# Patient Record
Sex: MALE | Race: WHITE
[De-identification: names, ages, dates, MRNs, and addresses within clinical notes are randomized per-mention and may not be internally consistent; named-entity substitution may affect disease eponyms.]

---
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
## 2021-11-19 ENCOUNTER — HOSPITAL ENCOUNTER (EMERGENCY)
Dept: HOSPITAL 47 - EC | Age: 21
Discharge: HOME | End: 2021-11-19
Payer: COMMERCIAL

## 2021-11-19 VITALS — TEMPERATURE: 98.2 F

## 2021-11-19 VITALS — HEART RATE: 67 BPM | DIASTOLIC BLOOD PRESSURE: 66 MMHG | RESPIRATION RATE: 16 BRPM | SYSTOLIC BLOOD PRESSURE: 120 MMHG

## 2021-11-19 DIAGNOSIS — F17.200: ICD-10-CM

## 2021-11-19 DIAGNOSIS — R40.4: Primary | ICD-10-CM

## 2021-11-19 DIAGNOSIS — J45.909: ICD-10-CM

## 2021-11-19 DIAGNOSIS — R40.0: ICD-10-CM

## 2021-11-19 LAB
ALBUMIN SERPL-MCNC: 4.3 G/DL (ref 3.5–5)
ALP SERPL-CCNC: 50 U/L (ref 38–126)
ALT SERPL-CCNC: 18 U/L (ref 4–49)
ANION GAP SERPL CALC-SCNC: 8 MMOL/L
AST SERPL-CCNC: 25 U/L (ref 17–59)
BASOPHILS # BLD AUTO: 0 K/UL (ref 0–0.2)
BASOPHILS NFR BLD AUTO: 0 %
BUN SERPL-SCNC: 12 MG/DL (ref 9–20)
CALCIUM SPEC-MCNC: 9.7 MG/DL (ref 8.4–10.2)
CHLORIDE SERPL-SCNC: 105 MMOL/L (ref 98–107)
CO2 SERPL-SCNC: 26 MMOL/L (ref 22–30)
EOSINOPHIL # BLD AUTO: 0 K/UL (ref 0–0.7)
EOSINOPHIL NFR BLD AUTO: 1 %
ERYTHROCYTE [DISTWIDTH] IN BLOOD BY AUTOMATED COUNT: 5.32 M/UL (ref 4.3–5.9)
ERYTHROCYTE [DISTWIDTH] IN BLOOD: 12.2 % (ref 11.5–15.5)
GLUCOSE SERPL-MCNC: 98 MG/DL (ref 74–99)
HCT VFR BLD AUTO: 47.5 % (ref 39–53)
HGB BLD-MCNC: 16.4 GM/DL (ref 13–17.5)
LYMPHOCYTES # SPEC AUTO: 1.5 K/UL (ref 1–4.8)
LYMPHOCYTES NFR SPEC AUTO: 23 %
MAGNESIUM SPEC-SCNC: 1.9 MG/DL (ref 1.6–2.3)
MCH RBC QN AUTO: 30.9 PG (ref 25–35)
MCHC RBC AUTO-ENTMCNC: 34.5 G/DL (ref 31–37)
MCV RBC AUTO: 89.4 FL (ref 80–100)
MONOCYTES # BLD AUTO: 0.2 K/UL (ref 0–1)
MONOCYTES NFR BLD AUTO: 4 %
NEUTROPHILS # BLD AUTO: 4.7 K/UL (ref 1.3–7.7)
NEUTROPHILS NFR BLD AUTO: 72 %
PLATELET # BLD AUTO: 216 K/UL (ref 150–450)
POTASSIUM SERPL-SCNC: 4.1 MMOL/L (ref 3.5–5.1)
PROT SERPL-MCNC: 7.2 G/DL (ref 6.3–8.2)
SODIUM SERPL-SCNC: 139 MMOL/L (ref 137–145)
WBC # BLD AUTO: 6.5 K/UL (ref 4–11)

## 2021-11-19 PROCEDURE — 99285 EMERGENCY DEPT VISIT HI MDM: CPT

## 2021-11-19 PROCEDURE — 85025 COMPLETE CBC W/AUTO DIFF WBC: CPT

## 2021-11-19 PROCEDURE — 96360 HYDRATION IV INFUSION INIT: CPT

## 2021-11-19 PROCEDURE — 83735 ASSAY OF MAGNESIUM: CPT

## 2021-11-19 PROCEDURE — 93005 ELECTROCARDIOGRAM TRACING: CPT

## 2021-11-19 PROCEDURE — 36415 COLL VENOUS BLD VENIPUNCTURE: CPT

## 2021-11-19 PROCEDURE — 80053 COMPREHEN METABOLIC PANEL: CPT

## 2021-11-19 PROCEDURE — 70450 CT HEAD/BRAIN W/O DYE: CPT

## 2021-11-19 NOTE — CT
EXAMINATION TYPE: CT brain wo con

 

DATE OF EXAM: 11/19/2021

 

COMPARISON: None.

 

HISTORY: Seizure 2 days ago fatigue since.

 

CT DLP: 1192.4 mGycm.  Automated Exposure Control for Dose Reduction was Utilized.

 

 

TECHNIQUE: CT scan of the head is performed without contrast.

 

FINDINGS:   There is no acute intracranial hemorrhage, mass effect, or midline shift identified.  The
 ventricles and sulci are within normal limits in size. Gray-white matter differentiation is maintain
ed. The globes are intact and the visualized sinuses are clear. The calvarium is intact.

 

IMPRESSION: Unremarkable study.

## 2021-11-19 NOTE — ED
General Adult HPI





- General


Chief complaint: Recheck/Abnormal Lab/Rx


Stated complaint: not feeling right, seizure wednesday


Time Seen by Provider: 11/19/21 13:23


Source: patient, RN notes reviewed


Mode of arrival: ambulatory


Limitations: no limitations





- History of Present Illness


Initial comments: 





Patient is a pleasant 21-year-old male presenting to the emergency department 

not feeling well.  Patient did have an episode 36 hours ago while eating dinner 

where he became unresponsive.  This episode lasted around a minute.  Patient did

have some abnormal movements during this.  It is questionable whether or not 

this may have been seizure activity.  Patient was slightly confused afterwards 

and has been drowsy since that time.  Patient was especially drowsy that night. 

Patient has had some light sensitivity.  No significant headaches or weakness.  

Patient does have a history of passing out once previously however no history of

seizure.





- Related Data


                                Home Medications











 Medication  Instructions  Recorded  Confirmed


 


LORazepam [Ativan] 1 mg PO TID PRN 11/19/21 11/19/21


 


Mirtazapine [Remeron] 30 mg PO HS 11/19/21 11/19/21











                                    Allergies











Allergy/AdvReac Type Severity Reaction Status Date / Time


 


No Known Allergies Allergy   Verified 11/19/21 13:47














Review of Systems


ROS Statement: 


Those systems with pertinent positive or pertinent negative responses have been 

documented in the HPI.





ROS Other: All systems not noted in ROS Statement are negative.


Constitutional: Denies: fever


Eyes: Denies: eye pain


ENT: Denies: ear pain


Respiratory: Denies: cough


Cardiovascular: Denies: chest pain


Endocrine: Reports: fatigue


Gastrointestinal: Reports: nausea.  Denies: abdominal pain, vomiting


Musculoskeletal: Denies: back pain


Skin: Denies: rash


Neurological: Denies: weakness, confusion





Past Medical History


Past Medical History: Asthma


History of Any Multi-Drug Resistant Organisms: None Reported


Past Surgical History: No Surgical Hx Reported


Past Psychological History: Anxiety, Depression


Smoking Status: Current every day smoker


Past Alcohol Use History: None Reported, Rare


Past Drug Use History: Marijuana





General Exam


Limitations: no limitations


General appearance: alert, in no apparent distress


Head exam: Present: normocephalic


Eye exam: Present: normal appearance, PERRL, EOMI.  Absent: nystagmus


ENT exam: Present: normal oropharynx


Neck exam: Present: normal inspection


Respiratory exam: Present: normal lung sounds bilaterally


Cardiovascular Exam: Present: regular rate, normal rhythm


GI/Abdominal exam: Present: soft.  Absent: tenderness


Extremities exam: Present: normal inspection


Neurological exam: Present: alert, oriented X3, CN II-XII intact.  Absent: motor

 sensory deficit


  ** Expanded


Neurological exam: Present: protecting the airway


Speech: Present: fluid speech


Cranial nerves: EOM's Intact: Normal


Sensory exam: Upper Extremity Light Touch: Normal, Lower Extremity Light Touch: 

Normal


Motor strength exam: RUE: 5, LUE: 5, RLE: 5, LLE: 5


Eye Response: (4) open spontaneously


Motor Response: (6) obeys commands


Verbal Response: (5) oriented


Psychiatric exam: Present: normal affect, normal mood


Skin exam: Present: normal color





Course


                                   Vital Signs











  11/19/21





  13:01


 


Temperature 98.2 F


 


Pulse Rate 64


 


Respiratory 18





Rate 


 


Blood Pressure 135/81


 


O2 Sat by Pulse 98





Oximetry 














EKG Findings





- EKG Comments:


EKG Findings:: Normal sinus rhythm with her is 72.  .  .  .  

.  Normal axis.  Normal QRS.  No acute ST change.





Medical Decision Making





- Medical Decision Making





Patient reevaluated and resting comfortably in bed.  Patient updated on results 

and need for follow-up.





- Lab Data


Result diagrams: 


                                 11/19/21 13:52





                                 11/19/21 13:52


                                   Lab Results











  11/19/21 11/19/21 Range/Units





  13:52 13:52 


 


WBC  6.5   (4.0-11.0)  k/uL


 


RBC  5.32   (4.30-5.90)  m/uL


 


Hgb  16.4   (13.0-17.5)  gm/dL


 


Hct  47.5   (39.0-53.0)  %


 


MCV  89.4   (80.0-100.0)  fL


 


MCH  30.9   (25.0-35.0)  pg


 


MCHC  34.5   (31.0-37.0)  g/dL


 


RDW  12.2   (11.5-15.5)  %


 


Plt Count  216   (150-450)  k/uL


 


MPV  7.6   


 


Neutrophils %  72   %


 


Lymphocytes %  23   %


 


Monocytes %  4   %


 


Eosinophils %  1   %


 


Basophils %  0   %


 


Neutrophils #  4.7   (1.3-7.7)  k/uL


 


Lymphocytes #  1.5   (1.0-4.8)  k/uL


 


Monocytes #  0.2   (0-1.0)  k/uL


 


Eosinophils #  0.0   (0-0.7)  k/uL


 


Basophils #  0.0   (0-0.2)  k/uL


 


Sodium   139  (137-145)  mmol/L


 


Potassium   4.1  (3.5-5.1)  mmol/L


 


Chloride   105  ()  mmol/L


 


Carbon Dioxide   26  (22-30)  mmol/L


 


Anion Gap   8  mmol/L


 


BUN   12  (9-20)  mg/dL


 


Creatinine   0.89  (0.66-1.25)  mg/dL


 


Est GFR (CKD-EPI)AfAm   >90  (>60 ml/min/1.73 sqM)  


 


Est GFR (CKD-EPI)NonAf   >90  (>60 ml/min/1.73 sqM)  


 


Glucose   98  (74-99)  mg/dL


 


Calcium   9.7  (8.4-10.2)  mg/dL


 


Magnesium   1.9  (1.6-2.3)  mg/dL


 


Total Bilirubin   1.3  (0.2-1.3)  mg/dL


 


AST   25  (17-59)  U/L


 


ALT   18  (4-49)  U/L


 


Alkaline Phosphatase   50  ()  U/L


 


Total Protein   7.2  (6.3-8.2)  g/dL


 


Albumin   4.3  (3.5-5.0)  g/dL














- Radiology Data


Radiology results: report reviewed (Computed tomography scan of the brain shows 

no acute process)





Disposition


Clinical Impression: 


 Unresponsive episode





Disposition: HOME SELF-CARE


Condition: Stable


Instructions (If sedation given, give patient instructions):  New-Onset Seizure 

in Adults (ED), Syncope (ED)


Additional Instructions: 


Avoid driving or other activities potential to cause harm.  Please follow-up 

with primary care physician in the next day or 2 for recheck.  Return for 

seizure activity, passing out, unresponsiveness, confusion or weakness, 

worsening or changing symptoms or other concerns.


Is patient prescribed a controlled substance at d/c from ED?: No


Referrals: 


Bhavik Burden MD [Primary Care Provider] - 1-2 days


Time of Disposition: 15:05